# Patient Record
Sex: FEMALE
[De-identification: names, ages, dates, MRNs, and addresses within clinical notes are randomized per-mention and may not be internally consistent; named-entity substitution may affect disease eponyms.]

---

## 2017-12-13 ENCOUNTER — HOSPITAL ENCOUNTER (EMERGENCY)
Dept: HOSPITAL 92 - ERS | Age: 27
Discharge: HOME | End: 2017-12-13
Payer: MEDICAID

## 2017-12-13 DIAGNOSIS — J02.0: Primary | ICD-10-CM

## 2017-12-13 LAB
ALP SERPL-CCNC: 115 U/L (ref 40–150)
ALT SERPL W P-5'-P-CCNC: 11 U/L (ref 8–55)
ANION GAP SERPL CALC-SCNC: 16 MMOL/L (ref 10–20)
AST SERPL-CCNC: 15 U/L (ref 5–34)
BILIRUB SERPL-MCNC: 1.6 MG/DL (ref 0.2–1.2)
BUN SERPL-MCNC: 8 MG/DL (ref 7–18.7)
CALCIUM SERPL-MCNC: 9.7 MG/DL (ref 7.8–10.44)
CHLORIDE SERPL-SCNC: 102 MMOL/L (ref 98–107)
CO2 SERPL-SCNC: 20 MMOL/L (ref 22–29)
CREAT CL PREDICTED SERPL C-G-VRATE: 0 ML/MIN (ref 70–130)
GLOBULIN SER CALC-MCNC: 3.8 G/DL (ref 2.4–3.5)
HCT VFR BLD CALC: 44.9 % (ref 36–47)
HYALINE CASTS #/AREA URNS LPF: (no result) LPF
PROT UR STRIP.AUTO-MCNC: 30 MG/DL
RBC # BLD AUTO: 5.1 MILL/UL (ref 4.2–5.4)
RBC UR QL AUTO: (no result) HPF (ref 0–3)
WBC # BLD AUTO: 19.4 THOU/UL (ref 4.8–10.8)
WBC UR QL AUTO: (no result) HPF (ref 0–3)

## 2017-12-13 PROCEDURE — 70450 CT HEAD/BRAIN W/O DYE: CPT

## 2017-12-13 PROCEDURE — 87430 STREP A AG IA: CPT

## 2017-12-13 PROCEDURE — 81015 MICROSCOPIC EXAM OF URINE: CPT

## 2017-12-13 PROCEDURE — 96375 TX/PRO/DX INJ NEW DRUG ADDON: CPT

## 2017-12-13 PROCEDURE — 93005 ELECTROCARDIOGRAM TRACING: CPT

## 2017-12-13 PROCEDURE — 96372 THER/PROPH/DIAG INJ SC/IM: CPT

## 2017-12-13 PROCEDURE — 80053 COMPREHEN METABOLIC PANEL: CPT

## 2017-12-13 PROCEDURE — 96361 HYDRATE IV INFUSION ADD-ON: CPT

## 2017-12-13 PROCEDURE — 81003 URINALYSIS AUTO W/O SCOPE: CPT

## 2017-12-13 PROCEDURE — 96365 THER/PROPH/DIAG IV INF INIT: CPT

## 2017-12-13 PROCEDURE — 85025 COMPLETE CBC W/AUTO DIFF WBC: CPT

## 2017-12-13 NOTE — CT
CT OF THE BRAIN WITHOUT CONTRAST:

 

Date:  12/13/17 

 

COMPARISON:  

None. 

 

HISTORY:  

Headaches that have gotten worse. Patient has had a headache every 3 days for 8 months, mainly on the
 left side. 

 

TECHNIQUE:  

Multiple contiguous axial images were obtained in a CT of the brain without contrast. 

 

FINDINGS:

The brain is normal in morphology and attenuation without focal lesions or confluent areas of infarct
ion. There is no evidence of hydrocephalus, intracranial hemorrhage, or extra-axial fluid collection.
 

 

The calvarium and overlying soft tissues are unremarkable. The visualized paranasal sinuses and masto
id air cells are well aerated. 

 

IMPRESSION: 

No evidence of acute intracranial abnormality.  

 

POS: [facility]

## 2019-11-05 ENCOUNTER — HOSPITAL ENCOUNTER (INPATIENT)
Dept: HOSPITAL 92 - L&D/OP | Age: 29
LOS: 3 days | Discharge: HOME | End: 2019-11-08
Attending: OBSTETRICS & GYNECOLOGY | Admitting: OBSTETRICS & GYNECOLOGY
Payer: COMMERCIAL

## 2019-11-05 VITALS — BODY MASS INDEX: 24.8 KG/M2

## 2019-11-05 DIAGNOSIS — Z3A.37: ICD-10-CM

## 2019-11-05 LAB
ALBUMIN SERPL BCG-MCNC: 3.5 G/DL (ref 3.5–5)
ALP SERPL-CCNC: 142 U/L (ref 40–110)
ALT SERPL W P-5'-P-CCNC: 15 U/L (ref 8–55)
ANION GAP SERPL CALC-SCNC: 16 MMOL/L (ref 10–20)
AST SERPL-CCNC: 21 U/L (ref 5–34)
BASOPHILS # BLD AUTO: 0 THOU/UL (ref 0–0.2)
BASOPHILS NFR BLD AUTO: 0.3 % (ref 0–1)
BILIRUB SERPL-MCNC: 0.8 MG/DL (ref 0.2–1.2)
BUN SERPL-MCNC: 7 MG/DL (ref 7–18.7)
CALCIUM SERPL-MCNC: 9.2 MG/DL (ref 7.8–10.44)
CHLORIDE SERPL-SCNC: 106 MMOL/L (ref 98–107)
CO2 SERPL-SCNC: 19 MMOL/L (ref 22–29)
CREAT CL PREDICTED SERPL C-G-VRATE: 164 ML/MIN (ref 70–130)
EOSINOPHIL # BLD AUTO: 0 THOU/UL (ref 0–0.7)
EOSINOPHIL NFR BLD AUTO: 0.3 % (ref 0–10)
GLOBULIN SER CALC-MCNC: 3.2 G/DL (ref 2.4–3.5)
GLUCOSE SERPL-MCNC: 80 MG/DL (ref 70–105)
HBSAG INDEX: 0.19 S/CO (ref 0–0.99)
HGB BLD-MCNC: 13.2 G/DL (ref 12–16)
LYMPHOCYTES # BLD: 2.2 THOU/UL (ref 1.2–3.4)
LYMPHOCYTES NFR BLD AUTO: 22.1 % (ref 21–51)
MCH RBC QN AUTO: 30.7 PG (ref 27–31)
MCV RBC AUTO: 87.7 FL (ref 78–98)
MONOCYTES # BLD AUTO: 0.6 THOU/UL (ref 0.11–0.59)
MONOCYTES NFR BLD AUTO: 5.6 % (ref 0–10)
NEUTROPHILS # BLD AUTO: 7.2 THOU/UL (ref 1.4–6.5)
NEUTROPHILS NFR BLD AUTO: 71.8 % (ref 42–75)
PLATELET # BLD AUTO: 234 THOU/UL (ref 130–400)
POTASSIUM SERPL-SCNC: 4.3 MMOL/L (ref 3.5–5.1)
PROT UR-MCNC: (no result) MG/DL (ref 1–14)
RBC # BLD AUTO: 4.3 MILL/UL (ref 4.2–5.4)
SODIUM SERPL-SCNC: 137 MMOL/L (ref 136–145)
SYPHILIS ANTIBODY INDEX: 0.03 S/CO
TSH SERPL DL<=0.005 MIU/L-ACNC: 1.99 UIU/ML (ref 0.35–4.94)
WBC # BLD AUTO: 10 THOU/UL (ref 4.8–10.8)

## 2019-11-05 PROCEDURE — S0020 INJECTION, BUPIVICAINE HYDRO: HCPCS

## 2019-11-05 PROCEDURE — 87340 HEPATITIS B SURFACE AG IA: CPT

## 2019-11-05 PROCEDURE — 10907ZC DRAINAGE OF AMNIOTIC FLUID, THERAPEUTIC FROM PRODUCTS OF CONCEPTION, VIA NATURAL OR ARTIFICIAL OPENING: ICD-10-PCS | Performed by: OBSTETRICS & GYNECOLOGY

## 2019-11-05 PROCEDURE — 86850 RBC ANTIBODY SCREEN: CPT

## 2019-11-05 PROCEDURE — 99285 EMERGENCY DEPT VISIT HI MDM: CPT

## 2019-11-05 PROCEDURE — 82570 ASSAY OF URINE CREATININE: CPT

## 2019-11-05 PROCEDURE — 90471 IMMUNIZATION ADMIN: CPT

## 2019-11-05 PROCEDURE — 86901 BLOOD TYPING SEROLOGIC RH(D): CPT

## 2019-11-05 PROCEDURE — 36415 COLL VENOUS BLD VENIPUNCTURE: CPT

## 2019-11-05 PROCEDURE — 86900 BLOOD TYPING SEROLOGIC ABO: CPT

## 2019-11-05 PROCEDURE — 84443 ASSAY THYROID STIM HORMONE: CPT

## 2019-11-05 PROCEDURE — 86780 TREPONEMA PALLIDUM: CPT

## 2019-11-05 PROCEDURE — 80053 COMPREHEN METABOLIC PANEL: CPT

## 2019-11-05 PROCEDURE — 36416 COLLJ CAPILLARY BLOOD SPEC: CPT

## 2019-11-05 PROCEDURE — 84156 ASSAY OF PROTEIN URINE: CPT

## 2019-11-05 PROCEDURE — G0008 ADMIN INFLUENZA VIRUS VAC: HCPCS

## 2019-11-05 PROCEDURE — 90686 IIV4 VACC NO PRSV 0.5 ML IM: CPT

## 2019-11-05 PROCEDURE — 85018 HEMOGLOBIN: CPT

## 2019-11-05 PROCEDURE — 85014 HEMATOCRIT: CPT

## 2019-11-05 RX ADMIN — Medication SCH MLS: at 18:40

## 2019-11-05 RX ADMIN — Medication SCH MLS: at 22:12

## 2019-11-05 NOTE — PDOC.FPROB
FMR OB H&P: HPI





- History of Present Illness


Chief Complaint: Elevated Blood Pressure


History of Present Illness: 


Pt is a 29 yo F  @ 37 wks by 9.2 weeks with history of hypothyroidism 

and gestational diabetes who presents for elevated blood pressures. She was at 

her [facility] appointment and they had 3 readings: 141/89, 143/89, and 142/96. She 

says she has had no other complications besides the gestational DM, which is 

diet controlled. She is currently on Synthroid for Hypothyroidism. 





Last delivery was full term baby boy via  with no complications.


[facility] 


[facility]





FMR OB H&P: Current Pregnancy





- Prenatal Care


: 2


Para: 1001


Gestational age: 37.0


Due date: 19


Dating Criteria: 1st trimester US @ 9.2 wks


Total weight gain: N/A


Course/Complications: 


Gestational DM





- OB Labs


Blood type: B


RH: positive


Antibody Screen: negative


HIV: negative


RPR: negative


HepBsAg: negative (titer below threshold for immunity)


Rubella: immune


Quad screen: unknown


Gonorrhea: negative


Pap Smear: Normal 18


1 hour gtt: 141


3 hour GTT: 96, 199, 156, 106


H&H: 12.4/35.8


Platelets: 251





FMR OB H&P: History





- Past Medical History


PMH: 


Hypothyroidism





- OB History


OB History: 


Gestational DM





- GYN History


GYN History: 


Normal pap on 18





- Surgical History


Sx History: 


None





- Social History


Social History: 


Lives at home with mother-in-law, brother-in-law, , and son. No alcohol, 

smoking, or drug use.





- Family History


Family History: 


Mother: HTN





FMR OB H&P: Medications





- Current


Home Medications: 


 











 Medication  Instructions  Recorded  Confirmed  Type


 


Levothyroxine Sodium 100 mg PO DAILY 19 History











Allergies/Adverse Reactions: 


 Allergies











Allergy/AdvReac Type Severity Reaction Status Date / Time


 


No Known Drug Allergies Allergy   Verified 10/29/15 07:32














FMR OB H&P: ROS





- Review of Systems


General: denies: fever/chills


Eyes: denies: vision changes, scotomas


ENT: denies: nasal congestion, rhinorrhea, sore throat


Cardiovascular: denies: chest pain, edema


Respiratory: denies: cough, congestion, shortness of breath


Gastrointestinal: denies: abdominal pain, vomiting, diarrhea, constipation


Genitourinary (Female): denies: dysuria, vaginal discharge, vaginal pain, 

vaginal bleeding


Musculoskeletal: denies: pain, arthritis/arthralgias


Neurologic: denies: numbness, weakness, headache


Integumentary: denies: itching, rash


Endocrine: denies: polydipsia, polyuria





FMR OB H&P: Vital Signs





- Maternal


Vital signs: 


 Vital Signs - First Documented











Temp Pulse Resp BP Pulse Ox


 


 98.7 F   75   16   130/92 H  99 


 


 19 10:28  19 10:28  19 10:28  19 10:28  19 10:28














- Fetal Heart Tones


Baseline: 150


Variability: moderate


Acceleration: present


Deceleration: absent


Category: category 1





FMR OB H&P: Physical Exam





- Physical Exam


General: NAD


HEENT: normocephalic and atraumatic, PERRLA, EOMI, MMM, oropharynx clear, good 

dention


Neck: supple, no LAD


Chest: non-tender to palpation


Heart: RRR, normal S1/S2, no murmurs/rubs/gallops


General: CTAB, no respiratory distress, good air movement


Abdomen: soft, gravid, non-tender, bowel sound present


Musculoskeletal: pulses present, FROM in all four extremities


Neurological: cranial nerves II through XII intact, no tremor, no focal deficit


Skin: no rash


Lymphatic: no unusual bruising or bleeding, no LAD


Psychiatric: normal mood and affect





- Pelvic Exam


Vulva: normal hair distribution


Cervix: no masses, no lesions


SVE: 50/-1


Membranes: Intact


Fetal Presentation: Vertex





FMR OB H&P: A/P





- Problem List


(1) High blood pressure


Current Visit: Yes   Status: Acute   Code(s): I10 - ESSENTIAL (PRIMARY) 

HYPERTENSION   





(2) Hypothyroidism


Current Visit: Yes   Status: Chronic   Code(s): E03.9 - HYPOTHYROIDISM, 

UNSPECIFIED   





(3) Gestational diabetes mellitus


Current Visit: Yes   Status: Acute   Code(s): O24.419 - GESTATIONAL DIABETES 

MELLITUS IN PREGNANCY, UNSP CONTROL   


Qualifiers: 


   Gestational diabetes mellitus control: diet-controlled 


Disposition: 


29 yo F  at 37 weeks by 9.2 week US with history of hypothyroidism and 

gestational diabetes who presents with elevated blood pressures.





1. Pre-Eclampsia 


BP in C 141/89, 143/89, and 142/96


* Blood pressures on admission were 130/92, 133/88, and 123/91


* Will get a Urine protein & creatinine, UA with microscopy, CMP, CBC


* Will induce today since she is term and has elevated pressures





2. Hypothyroidism


9/10/19 TSH: 0.732


* Will check TSH here


* Continue Synthroid





3. Gestational Diabetes


Diet Controlled


* White Classification: A1


* Will monitor Q6H





4. GBS Bacteriuria


2nd Trimester


* Will give prophylaxis Penicillin G





4. Term Pregnancy


Vertex Presentation


* Cervical Check: /


* Ramirez Score: 7


* Due to her dilation only being 1 cm we will start Cytotec 25mg Q3H, as to not 

down regulate receptors with pitocin





DVT Prophylaxis: SCD


Diet: Diabetic





 


Discussion: 


Date/Time: 19 [id number]











This H&P was discussed with  [] and  [] who agree with the above 

documentation and plan.

## 2019-11-05 NOTE — HP
TIME:  Roughly at 12 noon.



LOCATION:  Labor and Delivery in triage A. 



This is a patient of the prenatal clinic. 



The residents have assessed the patient and I am the faculty and I have assessed the

patient as well. 



HISTORY OF PRESENT ILLNESS:  In brief, this patient was sent over from the clinic

for elevated blood pressures while there.  She is a 28-year-old, G2, P1 with a

history of a prior vaginal delivery, who is now at 37 weeks by a 9.2-week

ultrasound.  She states that she was sent here for blood pressure monitoring.  Her

blood pressures were 140s/90s at the prenatal clinic.  She denies headache, visual

changes, or any other abnormal symptomatology. 



REVIEW OF SYSTEMS:  Complete review of systems was checked and is otherwise negative

unless specified in the HPI. 



PAST MEDICAL HISTORY:  Significant for hypothyroidism and she is on levothyroxine

supplementation. 



ALLERGIES:  NONE.



PAST SURGICAL HISTORY:  None.



PAST OB HISTORY:  She has had a vaginal delivery in the past and she currently has

gestational diabetes, which is A1. 



MEDICATIONS:  Hypothyroidism and she is only on diet control for her diabetes.



PHYSICAL EXAMINATION:

VITAL SIGNS:  Her blood pressure is 130/90s here, pulse is in the 80s.  She is

afebrile, and respirations are 18 and nonlabored. 

GENERAL:  Clinically, she is in no acute distress. 

ABDOMEN:  Soft and nontender.  Cervix is pending, resident evaluation which is in

process. 



I have reviewed the fetal strip and baseline is 130s to 140s and it is reactive with

moderate variability and accelerations.  There are no pathological decelerations.

No contractions on tocodynamometer. 



ASSESSMENT:  This is a 28-year-old, G2, P1, at 37 weeks by 9-week ultrasound, who is

here for elevated blood pressures.  Her blood pressures here are 130/90, and as she

is 37 weeks, ACOG recommends a trial of induction. 



PLAN:  

1. PIH labs.

2. Check TSH recommended.

3. Check her cervix and Cytotec if necessary for cervical ripening.

4. We can hold off on magnesium sulfate for now and decide to give magnesium

intrapartum if needed. 

5. No evidence of urgent hypertension at this time.







Job ID:  [id number]

## 2019-11-06 LAB — HGB BLD-MCNC: 11.9 G/DL (ref 12–16)

## 2019-11-06 RX ADMIN — DOCUSATE CALCIUM SCH MG: 240 CAPSULE, LIQUID FILLED ORAL at 21:25

## 2019-11-06 RX ADMIN — Medication SCH: at 10:57

## 2019-11-06 RX ADMIN — DOCUSATE CALCIUM SCH MG: 240 CAPSULE, LIQUID FILLED ORAL at 09:00

## 2019-11-06 NOTE — PDOC.LDPN
Labor & Delivery Progress Note





- Subjective


Subjective: comfortable, vaginal pressure





- Objective


Vital signs reviewed and normal: yes


General: NAD, resting, breathing through contractions


SVE: 1:30


Dilation: 10


Effacement: 100%


Station: 1+


FHT: category 1, variability present


[place] contractions every: 3-4 minutes


AROM: clear fluid





- Assessment


(1) Gestational diabetes mellitus


Code(s): O24.419 - GESTATIONAL DIABETES MELLITUS IN PREGNANCY, UNSP CONTROL   

Current Visit: Yes   Status: Acute   


Qualifiers: 


   Gestational diabetes mellitus control: diet-controlled 





(2) High blood pressure


Code(s): I10 - ESSENTIAL (PRIMARY) HYPERTENSION   Current Visit: Yes   Status: 

Acute   





(3) Hypothyroidism


Code(s): E03.9 - HYPOTHYROIDISM, UNSPECIFIED   Current Visit: Yes   Status: 

Chronic   


-: 





29 yo  @ 37.1 wks by 9.2 week sono here for induction of labor due to 

Gestational HTN and comorbities of Hypothyroidism and GDMA1


-Pt received cytotec x3


-Pt had cervical exam by nurse around 000 and was dilated to 5. Was having pain 

around then. Epidural has since been placed. Pain adequately controlled. 


-AROM @ 1:30. SVE 10/100/+1. Will plan for delivery


-GBS +- has recieved adequate coverage with penicillin 





Gestational HTN


-Bp stable. No severe range pressures. cont to monitor





GDMA1


-blood glucose stable. continued accuchecks q6 hrs. 





hypothyroidism


-TSH stable. cont home meds.

## 2019-11-06 NOTE — PDOC.LDPN
Labor & Delivery Progress Note





- Subjective


Subjective: comfortable





- Objective


Vital signs reviewed and normal: yes


General: NAD, resting


Uterine fundus: non tender


SVE: 21:40


Dilation: 2


Effacement: 50%


Station: -1


FHT: category 1


[place] contractions every: intermittent





- Assessment


(1) Gestational diabetes mellitus


Code(s): O24.419 - GESTATIONAL DIABETES MELLITUS IN PREGNANCY, UNSP CONTROL   

Current Visit: Yes   Status: Acute   


Qualifiers: 


   Gestational diabetes mellitus control: diet-controlled 





(2) High blood pressure


Code(s): I10 - ESSENTIAL (PRIMARY) HYPERTENSION   Current Visit: Yes   Status: 

Acute   





(3) Hypothyroidism


Code(s): E03.9 - HYPOTHYROIDISM, UNSPECIFIED   Current Visit: Yes   Status: 

Chronic   


Plan: continue plan of care


-: 





29 yo  @ 37 wks by 9.2 week sono here for induction of labor due to 

Gestational HTN and comorbities of Hypothyroidism and GDMA1


-Pt has received cytotec x2


-Cervical exam unchanged from previous. /-1 @ 21:40. Will place another 

cytotec


-Pt resting comfortably. Pain well controlled. Desires epidural in future. 

Anesthesia consulted


-GBS +- tx with penicillin 





Gestational HTN


-Bp stable. No severe range pressures. cont to monitor





GDMA1


-blood glucose stable. continued accuchecks q6 hrs. 





hypothyroidism


-TSH stable. cont home meds.

## 2019-11-06 NOTE — PDOC.OPDEL
OB Operative/Delivery Note


Delivery Dr/Surgeon: LAKSHMI Austin/Carrington


Assist: [place] (Staff...present for delivery)


Pre-Delivery Diagnosis: active labor, medically indicated induction (Cytotec x3 

for GHTN)


Weeks gestation: 37


Anesthesia: epidural





- Findings


  ** A


Apgar - 1 min: 8


Apgar - 5 min: 9





- Additional Findings/Plan


Placenta delivered: spontaneous (Joel at 0152; baby was at 0148)


Repaired Obstetrical Laceration: none


Estimated blood loss: 300


Compilations/Other Findings: 





vigours female; no complications; 3vc...intact placenta


Counts correct


Post delivery plan: routine recovery

## 2019-11-07 RX ADMIN — DOCUSATE CALCIUM SCH MG: 240 CAPSULE, LIQUID FILLED ORAL at 08:57

## 2019-11-07 RX ADMIN — DOCUSATE CALCIUM SCH MG: 240 CAPSULE, LIQUID FILLED ORAL at 21:03

## 2019-11-07 NOTE — PDOC.PP
Post Partum Progress Note


Post Partum Day #: 1


Subjective: 





pt resting in bed. has no acute concerns. Denies any chest pain or SOB. Deniez 

any swellig. Reports minimal lochia


PO intake tolerated: yes


Flatus: yes


Ambulation: yes


 Vital Signs (12 hours)











  Temp Pulse Resp BP BP Pulse Ox


 


 19 04:50  97.4 F L  73  18  114/72   98


 


 19 00:00  97.9 F  60  18   100/63  98


 


 19 20:07  98.3 F  75  16  119/67   98








 Weight











Weight                         69.4 kg

















- Physical Examination


General: NAD


Cardiovascular: no m/r/g, RRR


Respiratory: clear to auscultation bilaterally, non-labored breathing


Abdominal: + bowel sounds, lochia (reports lighter than period), no distention, 

appropriately TTP


Fundus firm & at: below umbilicus


Extremities: negative homans (B)


Neurological: no gross focal deficits


Psychiatric: A&Ox3, normal affect


Result Diagrams: 


 19 07:31





 19 12:57


Additional Labs: 


 Post Partum Labs











Blood Type  B POSITIVE   19  14:41    


 


Hep Bs Antigen  Non-Reactive S/CO (NonReactive)   19  12:57    











(1) Gestational diabetes mellitus


Code(s): O24.419 - GESTATIONAL DIABETES MELLITUS IN PREGNANCY, UNSP CONTROL   

Status: Acute   


Qualifiers: 


   Gestational diabetes mellitus control: diet-controlled 





(2) High blood pressure


Code(s): I10 - ESSENTIAL (PRIMARY) HYPERTENSION   Status: Acute   





(3) Hypothyroidism


Code(s): E03.9 - HYPOTHYROIDISM, UNSPECIFIED   Status: Chronic   





- Assessment/Plan





29 yo ->2 delivered LET[place] F @37.1 weeks via  on  @1:48


-VSS


-routine pp care


-breastfeeding lactatiom consulted


-pt pending D/C awaiting infant labs





Gestational HTN


- had elevated pressures to 140. reason for IOL


-pre e workup wnl


-BP stable pp. No elevated pressures noted





GDMA1 


-diet controlled


-no elevated BG


-repeat labs in 6 weeks





Hypothyroidism


-tsh 1.9 on admission. 


-continue current levothyroxine dose


-recheck tsh at pp visit

## 2019-11-08 VITALS — SYSTOLIC BLOOD PRESSURE: 109 MMHG | DIASTOLIC BLOOD PRESSURE: 63 MMHG | TEMPERATURE: 98.3 F

## 2019-11-08 RX ADMIN — DOCUSATE CALCIUM SCH MG: 240 CAPSULE, LIQUID FILLED ORAL at 09:04

## 2019-11-08 NOTE — PDOC.PP
Post Partum Progress Note


Post Partum Day #: 2


Subjective: 





Pt resting in bed. Has no acute concerns. Reports lochia as minimal. Denies any 

pain. Reports breastfeeding and doing well. 


PO intake tolerated: yes


Flatus: yes


Ambulation: yes


 Vital Signs (12 hours)











  Temp Pulse Resp BP BP Pulse Ox


 


 19 05:50  97.7 F  71  18  111/70   100


 


 19 20:01  98.3 F  88  20   131/67  98








 Weight











Weight                         69.4 kg

















- Physical Examination


General: NAD


Cardiovascular: no m/r/g, RRR


Respiratory: clear to auscultation bilaterally, non-labored breathing


Abdominal: + bowel sounds, lochia (reports lighter than period), no distention, 

appropriately TTP


Extremities: negative homans (B)


Neurological: no gross focal deficits


Psychiatric: A&Ox3, normal affect


Result Diagrams: 


 19 07:31





 19 12:57


Additional Labs: 


 Post Partum Labs











Blood Type  B POSITIVE   19  14:41    


 


Hep Bs Antigen  Non-Reactive S/CO (NonReactive)   19  12:57    











(1) Gestational diabetes mellitus


Code(s): O24.419 - GESTATIONAL DIABETES MELLITUS IN PREGNANCY, UNSP CONTROL   

Status: Acute   


Qualifiers: 


   Gestational diabetes mellitus control: diet-controlled 





(2) High blood pressure


Code(s): I10 - ESSENTIAL (PRIMARY) HYPERTENSION   Status: Acute   





(3) Hypothyroidism


Code(s): E03.9 - HYPOTHYROIDISM, UNSPECIFIED   Status: Chronic   





- Assessment/Plan





27 yo ->2 delivered [place] F @37.1 weeks via  on  @1:48


-VSS


-routine pp care


-breastfeeding lactation consulted


-pt ready for d/c later today. 





Gestational HTN


- had elevated pressures to 140. reason for IOL


-pre e workup wnl


-BP stable pp. No elevated pressures noted





GDMA1 


-diet controlled


-no elevated BG


-repeat labs in 6 weeks





Hypothyroidism


-tsh 1.9 on admission. 


-continue current levothyroxine dose


-recheck tsh at pp visit